# Patient Record
Sex: FEMALE
[De-identification: names, ages, dates, MRNs, and addresses within clinical notes are randomized per-mention and may not be internally consistent; named-entity substitution may affect disease eponyms.]

---

## 2020-03-04 ENCOUNTER — TRANSCRIPTION ENCOUNTER (OUTPATIENT)
Age: 30
End: 2020-03-04

## 2020-03-16 ENCOUNTER — TRANSCRIPTION ENCOUNTER (OUTPATIENT)
Age: 30
End: 2020-03-16

## 2023-03-01 ENCOUNTER — APPOINTMENT (OUTPATIENT)
Dept: ENDOCRINOLOGY | Facility: CLINIC | Age: 33
End: 2023-03-01
Payer: MEDICAID

## 2023-03-01 VITALS
WEIGHT: 115 LBS | DIASTOLIC BLOOD PRESSURE: 68 MMHG | BODY MASS INDEX: 22.58 KG/M2 | SYSTOLIC BLOOD PRESSURE: 114 MMHG | HEIGHT: 60 IN | HEART RATE: 79 BPM

## 2023-03-01 DIAGNOSIS — F50.9 EATING DISORDER, UNSPECIFIED: ICD-10-CM

## 2023-03-01 DIAGNOSIS — Z78.9 OTHER SPECIFIED HEALTH STATUS: ICD-10-CM

## 2023-03-01 DIAGNOSIS — L65.9 NONSCARRING HAIR LOSS, UNSPECIFIED: ICD-10-CM

## 2023-03-01 DIAGNOSIS — N92.6 IRREGULAR MENSTRUATION, UNSPECIFIED: ICD-10-CM

## 2023-03-01 PROCEDURE — 99203 OFFICE O/P NEW LOW 30 MIN: CPT

## 2023-03-01 NOTE — PHYSICAL EXAM
[Alert] : alert [Healthy Appearance] : healthy appearance [No Acute Distress] : no acute distress [EOMI] : extra ocular movement intact [Thyroid Not Enlarged] : the thyroid was not enlarged [No Thyroid Nodules] : no palpable thyroid nodules [No Respiratory Distress] : no respiratory distress [Clear to Auscultation] : lungs were clear to auscultation bilaterally [Normal S1, S2] : normal S1 and S2 [Normal Rate] : heart rate was normal [No Edema] : no peripheral edema [Normal Appearance] : normal in appearance [No Masses] : no palpable masses [No Nipple Discharge] : no nipple discharge [No Galactorrhea] : no galactorrhea [Not Tender] : non-tender [Soft] : abdomen soft [No Stigmata of Cushings Syndrome] : no stigmata of Cushings Syndrome [Normal Gait] : normal gait [No Tremors] : no tremors [Oriented x3] : oriented to person, place, and time [Normal Affect] : the affect was normal [Normal Insight/Judgement] : insight and judgment were intact [Normal Mood] : the mood was normal [Abdominal Striae] : no abdominal striae [Acanthosis Nigricans] : no acanthosis nigricans [Acne] : no acne [Hirsutism] : no hirsutism

## 2023-03-01 NOTE — HISTORY OF PRESENT ILLNESS
[FreeTextEntry1] : CC: pt complains that "periods are disappearing and having hair loss"\par \par Menarche 9 yrs old, Thelarche and Adrenarche 11 yrs old\par \par Has been on/off OCP since 14 yrs old. In her 20's she was off OCP and menses regular. \par History of eating disorder (binge eating) in [id number] and weight 100 pounds and menses were irregular at the time and had regular menses while on OCP.  \par In 2017 she had IUD placed and removed in 2019.  In 2019 she resumed OCP and stopped it 6/2021 and had another IU placed and menses regular at the time.  June 2022 IUD removed bc of heavy, painful menses. Initially after IUD removed menses normal (every 28 days for 4 days).  Then in October 2022 menses every 28 days but "light" - light bleed for 1.5 days. \par LMP 2/27/23 and currently menstruating - but only spotting.\par She wear a ring to monitor basal body temperature and temperature hasn't changed since ovulation 2 weeks ago.\par \par Eating habits normal and healthy.  \par Exercise 1 hour daily 5 days/week - weight lifting and running, has been exercising for a while.\par Denies covid/viral illness.  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 She has unprotected sex.  She is G0 and does not want to have children.  Recent pregnancy tests negative.\par \par Denies breast pain or nipple discharge.  \par Denies hot flashes.\par Complains of hair loss 2 months - 'lot of shedding".  Denies hirsutism/acne.  Denies skin rashes.

## 2023-03-01 NOTE — CONSULT LETTER
[Dear  ___] : Dear  [unfilled], [Consult Letter:] : I had the pleasure of evaluating your patient, [unfilled]. [Please see my note below.] : Please see my note below. [Consult Closing:] : Thank you very much for allowing me to participate in the care of this patient.  If you have any questions, please do not hesitate to contact me. [Sincerely,] : Sincerely, [FreeTextEntry3] : Brooklyn Black, \par

## 2023-03-01 NOTE — REVIEW OF SYSTEMS
[Anxiety] : anxiety [Stress] : stress [As Noted in HPI] : as noted in HPI [Fatigue] : no fatigue [Decreased Appetite] : appetite not decreased [Recent Weight Gain (___ Lbs)] : no recent weight gain [Recent Weight Loss (___ Lbs)] : no recent weight loss [Blurred Vision] : no blurred vision [Chest Pain] : no chest pain [Shortness Of Breath] : no shortness of breath [SOB on Exertion] : no shortness of breath on exertion [Nausea] : no nausea [Abdominal Pain] : no abdominal pain [Headaches] : no headaches [Dizziness] : no dizziness [Tremors] : no tremors [Depression] : no depression [FreeTextEntry7] : floating stools

## 2023-03-01 NOTE — DATA REVIEWED
[FreeTextEntry1] : Labs 12/15/22\par Gluc 86, A1c 4.8\par FSH 5.3, LH 9.2, estradiol 199\par insulin 5.3\par TSH 1.65, FT4 1.1\par total testo 23

## 2023-03-01 NOTE — ASSESSMENT
[FreeTextEntry1] : 32 year old female with complaints of  hair loss and light menses, specifically slow disappearance of menses - unclear etiology, her eating disorder in remission, exercises moderately and denies major stressors. In past menses had been regular on OCP.. She had some hormonal testing done 12/2022 that was normal - including estradiol, FSH, testo, thyroid levels\par \par she has no signs of endocrinopathy on exam, no other symptoms\par \par further testing needed: check prolactin, DHEAs, repeat FSH/estradiol, am cortisol, 17OHP, repeat testo levels; will advise further based on results\par we discussed that hair can be stress related with pronounced shedding phase, suggested that she may need to see dermatology\par if work up negative, will have pt follow up w Gyn for contraception, she does not desire children but having unprotected sex and constantly worried about getting pregnant.